# Patient Record
Sex: MALE | Race: BLACK OR AFRICAN AMERICAN | Employment: FULL TIME | URBAN - METROPOLITAN AREA
[De-identification: names, ages, dates, MRNs, and addresses within clinical notes are randomized per-mention and may not be internally consistent; named-entity substitution may affect disease eponyms.]

---

## 2017-10-01 PROBLEM — R07.9 CHEST PAIN: Status: ACTIVE | Noted: 2017-10-01

## 2017-10-16 ENCOUNTER — OFFICE VISIT (OUTPATIENT)
Dept: INTERNAL MEDICINE CLINIC | Age: 27
End: 2017-10-16

## 2017-10-16 VITALS
SYSTOLIC BLOOD PRESSURE: 140 MMHG | DIASTOLIC BLOOD PRESSURE: 88 MMHG | HEART RATE: 72 BPM | WEIGHT: 211 LBS | HEIGHT: 75 IN | BODY MASS INDEX: 26.24 KG/M2

## 2017-10-16 DIAGNOSIS — Q24.9 CONGENITAL HEART ANOMALY: ICD-10-CM

## 2017-10-16 DIAGNOSIS — I51.7 LVH (LEFT VENTRICULAR HYPERTROPHY): ICD-10-CM

## 2017-10-16 DIAGNOSIS — I51.7 ENLARGED RV (RIGHT VENTRICLE): ICD-10-CM

## 2017-10-16 DIAGNOSIS — E78.00 ELEVATED LDL CHOLESTEROL LEVEL: ICD-10-CM

## 2017-10-16 DIAGNOSIS — I10 ESSENTIAL HYPERTENSION: Primary | ICD-10-CM

## 2017-10-16 PROCEDURE — 99203 OFFICE O/P NEW LOW 30 MIN: CPT | Performed by: INTERNAL MEDICINE

## 2017-10-16 RX ORDER — LISINOPRIL 5 MG/1
5 TABLET ORAL DAILY
Qty: 30 TABLET | Refills: 3 | Status: SHIPPED | OUTPATIENT
Start: 2017-10-16 | End: 2017-11-27 | Stop reason: SDUPTHER

## 2017-10-16 ASSESSMENT — PATIENT HEALTH QUESTIONNAIRE - PHQ9
1. LITTLE INTEREST OR PLEASURE IN DOING THINGS: 0
SUM OF ALL RESPONSES TO PHQ QUESTIONS 1-9: 0
SUM OF ALL RESPONSES TO PHQ9 QUESTIONS 1 & 2: 0
2. FEELING DOWN, DEPRESSED OR HOPELESS: 0

## 2017-10-16 ASSESSMENT — ENCOUNTER SYMPTOMS
COUGH: 0
SORE THROAT: 0
CHEST TIGHTNESS: 0
VOICE CHANGE: 0
NAUSEA: 0
WHEEZING: 0
VOMITING: 0
EYE PAIN: 0
TROUBLE SWALLOWING: 0
COLOR CHANGE: 0
ABDOMINAL PAIN: 0
BLOOD IN STOOL: 0
SHORTNESS OF BREATH: 0
PHOTOPHOBIA: 0

## 2017-10-16 NOTE — PROGRESS NOTES
Magdy Jackson  1990  male  28 y.o. SUBJECTIVE:    Chief Complaint   Patient presents with   [facility] [place] Establish Care       Patient wants establish with me. He was recently admitted to [facility] for chest pain. He had a history of cardiac surgery because of anomolus  pulmonary vein in [place] [facility]. [facility] 20[facility] He had slightly elevated troponin level while in the hospital.  He underwent a stress test as well as echocardiogram.  Since hospital discharge he denies having any more chest pain, shortness of breath, dizziness or palpitation. Haven't restarted lisinopril yet. He was never told to have any restriction in physical activities. 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     Past Surgical History:   Procedure Laterality Date    CARDIAC SURGERY  1991    repair of partial anomalus Pulmonary venous connection by anastmisis of URIAH vein t left atrium    HERNIA REPAIR       Social History     Social History    Marital status: Single     Spouse name: N/A    Number of children: N/A    Years of education: N/A     Occupational History    [employer]     Social History Main Topics    Smoking status: Former Smoker     Quit date: 1/1/2012    Smokeless tobacco: Never Used    Alcohol use Yes      Comment: socially    Drug use: No    Sexual activity: Yes     Partners: Male     Birth control/ protection: Pill     Other Topics Concern    None     Social History Narrative    None     Family History   Problem Relation Age of Onset    Diabetes Paternal Grandmother     Hypertension Paternal Grandfather        Review of Systems   Constitutional: Negative for appetite change and fatigue. HENT: Negative for hearing loss, sore throat, trouble swallowing and voice change.     Eyes: Negative for tenderness. Lymphadenopathy:     He has no cervical adenopathy. Neurological: He is alert and oriented to person, place, and time. He exhibits normal muscle tone. Skin: Skin is warm and dry. No rash noted. No erythema. Numerous tattoos   Psychiatric: He has a normal mood and affect. His behavior is normal. Thought content normal.   Nursing note and vitals reviewed. CBC:   Lab Results   Component Value Date    WBC 7.9 09/30/2017    HGB 15.8 09/30/2017    HCT 46.8 09/30/2017    MCH 29.9 09/30/2017    MCHC 33.7 09/30/2017    RDW 13.0 09/30/2017     09/30/2017    MPV 7.4 09/30/2017     CMP:   Lab Results   Component Value Date     09/30/2017    K 4.1 09/30/2017     09/30/2017    CO2 28 09/30/2017    ANIONGAP 11 09/30/2017    GLUCOSE 116 09/30/2017    BUN 15 09/30/2017    CREATININE 1.0 09/30/2017    GFRAA >60 09/30/2017    GFRAA >60 03/15/2011    CALCIUM 9.2 09/30/2017    PROT 6.9 03/15/2011    LABALBU 4.3 03/15/2011    BILITOT 0.50 03/15/2011    ALKPHOS 70 03/15/2011    ALT 26 03/15/2011    AST 41 03/15/2011     URINALYSIS:   Lab Results   Component Value Date    COLORU YELLOW 03/15/2011    CLARITYU CLOUDY 03/15/2011    GLUCOSEU NEGATIVE 03/15/2011    BILIRUBINUR NEGATIVE 03/15/2011    KETUA NEGATIVE 03/15/2011    SPECGRAV 1.015 03/15/2011    BLOODU NEGATIVE 03/15/2011    PHUR 7.5 03/15/2011    PROTEINU NEGATIVE 03/15/2011    UROBILINOGEN 0.2 03/15/2011    NITRU NEGATIVE 03/15/2011    LEUKOCYTESUR NEGATIVE 03/15/2011     LIPID:   Lab Results   Component Value Date    CHOL 188 09/30/2017    TRIG 68 09/30/2017    HDL 56 09/30/2017    HDL 47 03/15/2011    LDLCALC 118 09/30/2017    LABVLDL 14 09/30/2017     I have reviewed patient's immediate past pertinent old medical record from Chart     ASSESSMENT/PLAN:    Arlene Oropeza was seen today for establish care. Diagnoses and all orders for this visit:    Essential hypertension  -     lisinopril (PRINIVIL;ZESTRIL) 5 MG tablet;  Take 1 tablet by mouth daily  -     Ambulatory referral to Cardiology  Will repeat liver function tests and basic metabolic panel in his next visit in 6 weeks. Congenital heart anomaly  -     lisinopril (PRINIVIL;ZESTRIL) 5 MG tablet; Take 1 tablet by mouth daily  -     Ambulatory referral to Cardiology    LVH (left ventricular hypertrophy)  -     lisinopril (PRINIVIL;ZESTRIL) 5 MG tablet; Take 1 tablet by mouth daily  -     Ambulatory referral to Cardiology    Enlarged RV (right ventricle)  -     lisinopril (PRINIVIL;ZESTRIL) 5 MG tablet; Take 1 tablet by mouth daily  -     Ambulatory referral to Cardiology    Will recommend yearly cardiology follow-up. Continue diet exercise for elevated LDL    Orders Placed This Encounter   Procedures    Ambulatory referral to Cardiology     Referral Priority:   Routine     Referral Type:   Consult for Advice and Opinion     Referred to Provider:   Sissy Gutierrez MD     Requested Specialty:   Cardiology     Number of Visits Requested:   1     Current Outpatient Prescriptions   Medication Sig Dispense Refill    lisinopril (PRINIVIL;ZESTRIL) 5 MG tablet Take 1 tablet by mouth daily 30 tablet 3     No current facility-administered medications for this visit. Return in about 6 weeks (around 11/27/2017). An After Visit Summary was printed and given to the patient. Documentation was done using voice recognition dragon software. Every effort was made to ensure accuracy; however, inadvertent  Unintentional computerized transcription errors may be present.

## 2017-11-27 ENCOUNTER — OFFICE VISIT (OUTPATIENT)
Dept: INTERNAL MEDICINE CLINIC | Age: 27
End: 2017-11-27

## 2017-11-27 VITALS
BODY MASS INDEX: 26.86 KG/M2 | SYSTOLIC BLOOD PRESSURE: 128 MMHG | HEART RATE: 76 BPM | WEIGHT: 216 LBS | HEIGHT: 75 IN | DIASTOLIC BLOOD PRESSURE: 88 MMHG

## 2017-11-27 DIAGNOSIS — R79.89 LFTS ABNORMAL: ICD-10-CM

## 2017-11-27 DIAGNOSIS — I51.7 ENLARGED RV (RIGHT VENTRICLE): ICD-10-CM

## 2017-11-27 DIAGNOSIS — Q24.9 CONGENITAL HEART ANOMALY: ICD-10-CM

## 2017-11-27 DIAGNOSIS — I51.7 LVH (LEFT VENTRICULAR HYPERTROPHY): ICD-10-CM

## 2017-11-27 DIAGNOSIS — I10 ESSENTIAL HYPERTENSION: ICD-10-CM

## 2017-11-27 DIAGNOSIS — I10 BENIGN ESSENTIAL HTN: Primary | ICD-10-CM

## 2017-11-27 DIAGNOSIS — I10 BENIGN ESSENTIAL HTN: ICD-10-CM

## 2017-11-27 LAB
ALBUMIN SERPL-MCNC: 4.6 G/DL (ref 3.4–5)
ALP BLD-CCNC: 88 U/L (ref 40–129)
ALT SERPL-CCNC: 86 U/L (ref 10–40)
ANION GAP SERPL CALCULATED.3IONS-SCNC: 18 MMOL/L (ref 3–16)
AST SERPL-CCNC: 56 U/L (ref 15–37)
BILIRUB SERPL-MCNC: <0.2 MG/DL (ref 0–1)
BILIRUBIN DIRECT: <0.2 MG/DL (ref 0–0.3)
BILIRUBIN, INDIRECT: ABNORMAL MG/DL (ref 0–1)
BUN BLDV-MCNC: 18 MG/DL (ref 7–20)
CALCIUM SERPL-MCNC: 9.6 MG/DL (ref 8.3–10.6)
CHLORIDE BLD-SCNC: 101 MMOL/L (ref 99–110)
CO2: 26 MMOL/L (ref 21–32)
CREAT SERPL-MCNC: 1.1 MG/DL (ref 0.9–1.3)
GFR AFRICAN AMERICAN: >60
GFR NON-AFRICAN AMERICAN: >60
GLUCOSE BLD-MCNC: 86 MG/DL (ref 70–99)
POTASSIUM SERPL-SCNC: 4.4 MMOL/L (ref 3.5–5.1)
SODIUM BLD-SCNC: 145 MMOL/L (ref 136–145)
TOTAL PROTEIN: 7.4 G/DL (ref 6.4–8.2)

## 2017-11-27 PROCEDURE — 99213 OFFICE O/P EST LOW 20 MIN: CPT | Performed by: INTERNAL MEDICINE

## 2017-11-27 RX ORDER — LISINOPRIL 5 MG/1
5 TABLET ORAL DAILY
Qty: 90 TABLET | Refills: 3 | Status: SHIPPED | OUTPATIENT
Start: 2017-11-27

## 2017-11-27 ASSESSMENT — ENCOUNTER SYMPTOMS
VOMITING: 0
COUGH: 0
SHORTNESS OF BREATH: 0
NAUSEA: 0
WHEEZING: 0

## 2017-11-27 NOTE — PROGRESS NOTES
Shiloh Bear  1990  male  28 y.o. SUBJECTIVE:    Chief Complaint   Patient presents with    Follow-up     6 week    Hypertension       Hypertension:    Shiloh Bear returns for follow up of hypertension. Tolerating medications well and taking them as directed. Does not check BP at home. No symptoms (denies chest pain,dyspnea,edema or TIA's or blurred vision) concerning for end organ damage are present. History of congenital heart disease. Patient is advised to make appointment with cardiologist however she did not make appointment yet. 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     Past Surgical History:   Procedure Laterality Date    CARDIAC SURGERY  1991    repair of partial anomalus Pulmonary venous connection by anastmisis of UIRAH vein t left atrium    HERNIA REPAIR       Social History     Social History    Marital status: Single     Spouse name: N/A    Number of children: 1    Years of education: N/A     Occupational History    [employer]     Social History Main Topics    Smoking status: Former Smoker     Quit date: 1/1/2012    Smokeless tobacco: Never Used    Alcohol use Yes      Comment: socially    Drug use: No    Sexual activity: Yes     Partners: Male     Birth control/ protection: Pill     Other Topics Concern    None     Social History Narrative    None     Family History   Problem Relation Age of Onset    Diabetes Paternal Grandmother     Hypertension Paternal Grandfather        Review of Systems   Constitutional: Negative for fatigue and fever. Respiratory: Negative for cough, shortness of breath and wheezing. Cardiovascular: Negative for palpitations and leg swelling. Gastrointestinal: Negative for nausea and vomiting.

## 2017-11-28 DIAGNOSIS — R74.8 ELEVATED LIVER ENZYMES: Primary | ICD-10-CM

## 2017-12-07 ENCOUNTER — OFFICE VISIT (OUTPATIENT)
Dept: CARDIOLOGY CLINIC | Age: 27
End: 2017-12-07

## 2017-12-07 VITALS
HEART RATE: 80 BPM | HEIGHT: 75 IN | DIASTOLIC BLOOD PRESSURE: 80 MMHG | BODY MASS INDEX: 26.62 KG/M2 | WEIGHT: 214.12 LBS | SYSTOLIC BLOOD PRESSURE: 136 MMHG

## 2017-12-07 DIAGNOSIS — I51.7 LVH (LEFT VENTRICULAR HYPERTROPHY): ICD-10-CM

## 2017-12-07 DIAGNOSIS — I10 BENIGN ESSENTIAL HTN: Primary | ICD-10-CM

## 2017-12-07 DIAGNOSIS — E78.00 ELEVATED LDL CHOLESTEROL LEVEL: ICD-10-CM

## 2017-12-07 DIAGNOSIS — R00.2 PALPITATIONS: ICD-10-CM

## 2017-12-07 DIAGNOSIS — R93.1 ABNORMAL ECHOCARDIOGRAM: ICD-10-CM

## 2017-12-07 DIAGNOSIS — I51.7 ENLARGED RV (RIGHT VENTRICLE): ICD-10-CM

## 2017-12-07 DIAGNOSIS — R07.9 CHEST PAIN, UNSPECIFIED TYPE: ICD-10-CM

## 2017-12-07 PROCEDURE — 99214 OFFICE O/P EST MOD 30 MIN: CPT | Performed by: INTERNAL MEDICINE

## 2017-12-07 NOTE — PROGRESS NOTES
[facility]   Cardiac Followup    Referring Provider:  Steve Egan MD     Chief Complaint   Patient presents with    Follow-Up from Hospital     was in the hospital back in october with chest pains       History of Present Illness:  Mr. Robert Lim presents for follow-up of an admission for chest pain. He does have a past history of anomalous pulmonary venous return which was surgically corrected when he was 32 years old. He was initially following with [facility] congenital clinic but stopped following. He used to do this at 3 year intervals. He was admitted in October 2017 for chest pain. He ruled out for myocardial infarction and was subsequently discharged home. Today, he states that he has done well since his hospitalizations in October 2017 for chest pain. He has had two brief episodes of palpiations. Lasting 5-10 seconds. He is back in the gym and has had no limitations. Blood pressures at home have been normal.    Past Medical History:   has a past medical history of Congenital heart anomaly. Surgical History:   has a past surgical history that includes hernia repair and Cardiac surgery (1991). Social History:   reports that he quit smoking about 5 years ago. He has never used smokeless tobacco. He reports that he drinks alcohol. He reports that he does not use drugs. Family History:  family history includes Diabetes in his paternal grandmother; Hypertension in his paternal grandfather. Home Medications:  Prior to Admission medications    Medication Sig Start Date End Date Taking? Authorizing Provider   lisinopril (PRINIVIL;ZESTRIL) 5 MG tablet Take 1 tablet by mouth daily 11/27/17  Yes Steve Egan MD        Allergies:  Review of patient's allergies indicates no known allergies. Review of Systems:   A 10 point review of systems is negative except as noted in the history of present illness.     Physical Examination:    Vitals:    12/07/17 0915 BP: 136/80   Pulse: 80   Stable. Constitutional and General Appearance: NAD   Respiratory:  · Normal excursion and expansion without use of accessory muscles  · Resp Auscultation: Normal breath sounds without dullness  Cardiovascular:  · The apical impulses not displaced  · JVD is normal  · The carotid upstroke is normal in amplitude and contour without delay or bruit  · Normal S1S2, No S3, No Murmur  · Peripheral pulses are symmetrical and full  · There is no clubbing, cyanosis of the extremities. · No edema  · Pedal Pulses: 2+ and equal   Abdomen:  · No masses or tenderness  · Liver/Spleen: No Abnormalities Noted  Neurological/Psychiatric:  · Alert and oriented in all spheres  · Moves all extremities well  · Exhibits normal gait balance and coordination  · No abnormalities of mood, affect, memory, mentation, or behavior are noted      Myoview stress test 10/2/17:   Summary    Normal myocardial perfusion.    Normal LV function.    Non-diagnostic EKG response due to baseline abnormalities . Echo 10/2/17:  Summary  Normal left ventricle size and systolic function with an estimated ejection fraction of 55-60%. No regional wall motion abnormalities are seen. Borderline concentric left ventricular hypertrophy is present. Diastolic filling parameters suggests normal diastolic function . E/e'= 8.1 .  No evidence of significant valvular insufficiency. The right heart is enlarged. Right ventricular systolic function appears normal .    Assessment:   1. Benign essential HTN    2. Chest pain, unspecified type    3. Enlarged RV (right ventricle)    4. LVH (left ventricular hypertrophy)    5. Palpitations    6. Elevated LDL cholesterol level      Plan:  Echo in 1 year day of visit or prior    Thank you for allowing me to participate in the care of this individual.    Yandy Galindo.  Dorothy Mckeon M.D., [facility] [place]